# Patient Record
Sex: FEMALE
[De-identification: names, ages, dates, MRNs, and addresses within clinical notes are randomized per-mention and may not be internally consistent; named-entity substitution may affect disease eponyms.]

---

## 2020-11-16 ENCOUNTER — HOSPITAL ENCOUNTER (EMERGENCY)
Dept: HOSPITAL 33 - ED | Age: 35
Discharge: HOME | End: 2020-11-16
Payer: COMMERCIAL

## 2020-11-16 VITALS — SYSTOLIC BLOOD PRESSURE: 124 MMHG | DIASTOLIC BLOOD PRESSURE: 84 MMHG | HEART RATE: 62 BPM

## 2020-11-16 VITALS — OXYGEN SATURATION: 97 %

## 2020-11-16 DIAGNOSIS — R10.31: Primary | ICD-10-CM

## 2020-11-16 LAB
ALBUMIN SERPL-MCNC: 4.3 G/DL (ref 3.5–5)
ALP SERPL-CCNC: 61 U/L (ref 38–126)
ALT SERPL-CCNC: 14 U/L (ref 0–35)
AMYLASE SERPL-CCNC: 72 U/L (ref 30–110)
ANION GAP SERPL CALC-SCNC: 9.8 MEQ/L (ref 5–15)
AST SERPL QL: 22 U/L (ref 14–36)
BASOPHILS # BLD AUTO: 0.02 10*3/UL (ref 0–0.4)
BASOPHILS NFR BLD AUTO: 0.3 % (ref 0–0.4)
BILIRUB BLD-MCNC: 0.5 MG/DL (ref 0.2–1.3)
BUN SERPL-MCNC: 11 MG/DL (ref 7–17)
CALCIUM SPEC-MCNC: 9.8 MG/DL (ref 8.4–10.2)
CHLORIDE SERPL-SCNC: 103 MMOL/L (ref 98–107)
CO2 SERPL-SCNC: 28 MMOL/L (ref 22–30)
CREAT SERPL-MCNC: 0.79 MG/DL (ref 0.52–1.04)
EOSINOPHIL # BLD AUTO: 0.26 10*3/UL (ref 0–0.5)
GFR SERPLBLD BASED ON 1.73 SQ M-ARVRAT: > 60 ML/MIN
GLUCOSE SERPL-MCNC: 103 MG/DL (ref 74–106)
GLUCOSE UR-MCNC: NEGATIVE MG/DL
HCT VFR BLD AUTO: 42.5 % (ref 35–47)
HGB BLD-MCNC: 14.5 GM/DL (ref 12–16)
LIPASE SERPL-CCNC: 155 U/L (ref 23–300)
LYMPHOCYTES # SPEC AUTO: 2.77 10*3/UL (ref 1–4.6)
MCH RBC QN AUTO: 29.8 PG (ref 26–32)
MCHC RBC AUTO-ENTMCNC: 34.1 G/DL (ref 32–36)
MONOCYTES # BLD AUTO: 0.69 10*3/UL (ref 0–1.3)
PLATELET # BLD AUTO: 307 K/MM3 (ref 150–450)
POTASSIUM SERPLBLD-SCNC: 4 MMOL/L (ref 3.5–5.1)
PROT SERPL-MCNC: 7.7 G/DL (ref 6.3–8.2)
PROT UR STRIP-MCNC: NEGATIVE MG/DL
RBC # BLD AUTO: 4.87 M/MM3 (ref 4.1–5.4)
RBC #/AREA URNS HPF: (no result) /HPF (ref 0–2)
SODIUM SERPL-SCNC: 137 MMOL/L (ref 137–145)
WBC # BLD AUTO: 7.7 K/MM3 (ref 4–10.5)
WBC #/AREA URNS HPF: (no result) /HPF (ref 0–5)

## 2020-11-16 PROCEDURE — 36000 PLACE NEEDLE IN VEIN: CPT

## 2020-11-16 PROCEDURE — 81001 URINALYSIS AUTO W/SCOPE: CPT

## 2020-11-16 PROCEDURE — 76830 TRANSVAGINAL US NON-OB: CPT

## 2020-11-16 PROCEDURE — 99284 EMERGENCY DEPT VISIT MOD MDM: CPT

## 2020-11-16 PROCEDURE — 84703 CHORIONIC GONADOTROPIN ASSAY: CPT

## 2020-11-16 PROCEDURE — 96374 THER/PROPH/DIAG INJ IV PUSH: CPT

## 2020-11-16 PROCEDURE — 36415 COLL VENOUS BLD VENIPUNCTURE: CPT

## 2020-11-16 PROCEDURE — 85025 COMPLETE CBC W/AUTO DIFF WBC: CPT

## 2020-11-16 PROCEDURE — 83690 ASSAY OF LIPASE: CPT

## 2020-11-16 PROCEDURE — 74176 CT ABD & PELVIS W/O CONTRAST: CPT

## 2020-11-16 PROCEDURE — 82150 ASSAY OF AMYLASE: CPT

## 2020-11-16 PROCEDURE — 80053 COMPREHEN METABOLIC PANEL: CPT

## 2020-11-16 NOTE — ERPHSYRPT
- History of Present Illness


Historian: patient


Patient Subjective Stated Complaint: Right sided pain x 1 week, RLQ pain x 

today.


Triage Nursing Assessment: Pt reports right flank pain last week, today having 

worsening right sided pain and lower pelvic pain as well as suprapubic pain. 

Abdomen tender to palpation, bowel sounds audible x 4, denies frequency or 

hematuria. Right sided pain is constant. Reports white vaginal discharge. LMP 1 

week ago. Skin PWD.


Physician History: 





36 yo wf w supra-pubic pain today/9 out of 10/sharp/R flank pain/nothing makes 

pain better or worse. Pt currently on cipro for uti prescribed last week by Dr. Dubon. She denies fever/dysuria/hematuria/N/V/D. She has had a BTL.


Timing/Duration: today


Activities at Onset: rest


Quality: sharpness


Abdominal Pain Onset Location: suprapubic


Pain Radiation: flank


Severity of Pain-Max: severe


Severity of Pain-Current: severe


Modifying Factors: Improves With: nothing


Associated Symptoms: No chest pain, No diaphoresis, No diarrhea, No 

fever/chills, No fatigue, No headache, No heartburn, No loss of appetite, No 

nausea, No neck pain, No rash, No shortness of breath, No syncope, No testicular

pain, No vomiting, No weakness


Previous symptoms: no prior history


Allergies/Adverse Reactions: 








No Known Drug Allergies Allergy (Unverified 11/16/20 19:03)


   





Hx Tetanus, Diphtheria Vaccination/Date Given: Yes


Hx Influenza Vaccination/Date Given: Yes


Hx Pneumococcal Vaccination/Date Given: Yes


Immunizations Up to Date: Yes





Travel Risk





- International Travel


Have you traveled outside of the country in past 3 weeks: No





- Coronavirus Screening


Are you exhibiting any of the following symptoms?: No


Close contact with a COVID-19 positive Pt in past 14-21 Days: No





- Review of Systems


Constitutional: No Symptoms


Eyes: No Symptoms


Ears, Nose, & Throat: No Symptoms


Respiratory: No Symptoms


Cardiac: No Symptoms


Abdominal/Gastrointestinal: No Symptoms, Abdominal Pain


Genitourinary Symptoms: No Symptoms


Musculoskeletal: No Symptoms


Skin: No Symptoms


Neurological: No Symptoms


Psychological: No Symptoms


Endocrine: No Symptoms


Hematologic/Lymphatic: No Symptoms


Immunological/Allergic: No Symptoms





- Past Medical History


Pertinent Past Medical History: No





- Past Surgical History


Past Surgical History: Yes


Gastrointestinal: Cholecystectomy


Female Surgical History: Tubal Ligation





- Social History


Smoking Status: Never smoker


Exposure to second hand smoke: Yes


Drug Use: none


Patient Lives Alone: No


Significant Family History: no pertinent family hx





- Female History


Hx Last Menstrual Period: 11/09/2020


Hx Pregnant Now: No (hx tubal)





- Nursing Vital Signs


Nursing Vital Signs: 


                               Initial Vital Signs











Temperature  98.8 F   11/16/20 18:53


 


Pulse Rate  95 H  11/16/20 18:53


 


Respiratory Rate  16   11/16/20 18:53


 


Blood Pressure  140/91   11/16/20 18:53


 


O2 Sat by Pulse Oximetry  97   11/16/20 18:53








                                   Pain Scale











Pain Intensity                 3

















- Physical Exam


General Appearance: no apparent distress


Eye Exam: PERRL/EOMI, eyes nml inspection


Ears, Nose, Throat Exam: normal ENT inspection, TMs normal, pharynx normal, 

moist mucous membranes


Neck Exam: normal inspection, non-tender, supple, full range of motion, No 

meningismus, No mass, No Brudzinski, No Kernig's


Respiratory Exam: normal breath sounds, lungs clear, airway intact, No 

respiratory distress


Cardiovascular Exam: regular rate/rhythm, normal heart sounds, normal peripheral

pulses, No murmur


Gastrointestinal/Abdomen Exam: soft, tenderness (Supra-pubic ttp wo 

guarding/rebound)


Back Exam: normal inspection, normal range of motion, CVA tenderness (R)


Extremity Exam: normal inspection, normal range of motion


Neurologic Exam: alert, oriented x 3, cooperative, CNs II-XII nml as tested, 

normal mood/affect, sensation nml, No motor deficits, No sensory deficit


Skin Exam: normal color, warm, dry, No rash


Lymphatic Exam: No adenopathy


**SpO2 Interpretation**: normal


SpO2: 97


O2 Delivery: Room Air





- CT Exams


  ** Abdomen/Pelvis


CT Interpretation: Discussed w/radiologist (Splenomegaly/Nothing acute/did not 

visualize appendix but no inflammation)





- Radiology Ultrasound Exam


  ** Pelvis


Ultrasound: Other (Neg for torsion/cyst per tech)


Ordered Tests: 


                               Active Orders 24 hr











 Category Date Time Status


 


 ABDOMEN AND PELVIS W/0 CONTRAS [CT] Stat Exams  11/16/20 19:54 Taken


 


 PELVIS TRANS VAGINAL [US] Stat Exams  11/16/20 22:03 Taken


 


 AMYLASE Stat Lab  11/16/20 20:40 Completed


 


 CBC W DIFF Stat Lab  11/16/20 20:40 Completed


 


 CMP Stat Lab  11/16/20 20:40 Completed


 


 HCG,QUALITATIVE URINE Stat Lab  11/16/20 19:00 Completed


 


 LIPASE Stat Lab  11/16/20 20:40 Completed


 


 UA W/RFX UR CULTURE Stat Lab  11/16/20 19:05 Completed








Medication Summary














Discontinued Medications














Generic Name Dose Route Start Last Admin





  Trade Name Freq  PRN Reason Stop Dose Admin


 


Ketorolac Tromethamine  30 mg  11/16/20 20:30  11/16/20 20:37





  Toradol 30 Mg Injection***  IV  11/16/20 20:31  30 mg





  STAT ONE   Administration


 


Ketorolac Tromethamine  Confirm  11/16/20 20:36 





  Toradol 30 Mg Injection***  Administered  11/16/20 20:37 





  Dose  





  30 mg  





  .ROUTE  





  .STK-MED ONE  











Lab/Rad Data: 


                           Laboratory Result Diagrams





                                 11/16/20 20:40 





                                 11/16/20 20:40 





                               Laboratory Results











  11/16/20 11/16/20 11/16/20 Range/Units





  20:40 20:40 19:05 


 


WBC   7.7   (4.0-10.5)  K/mm3


 


RBC   4.87   (4.1-5.4)  M/mm3


 


Hgb   14.5   (12.0-16.0)  gm/dl


 


Hct   42.5   (35-47)  %


 


MCV   87.3   ()  fl


 


MCH   29.8   (26-32)  pg


 


MCHC   34.1   (32-36)  g/dl


 


RDW   12.7   (11.5-14.0)  %


 


Plt Count   307   (150-450)  K/mm3


 


MPV   9.5   (7.5-11.0)  fl


 


Gran %   51.1   (36.0-66.0)  %


 


Eos # (Auto)   0.26   (0-0.5)  


 


Absolute Lymphs (auto)   2.77   (1.0-4.6)  


 


Absolute Monos (auto)   0.69   (0.0-1.3)  


 


Lymphocytes %   36.2   (24.0-44.0)  %


 


Monocytes %   9.0   (0.0-12.0)  %


 


Eosinophils %   3.4   (0.00-5.0)  %


 


Basophils %   0.3   (0.0-0.4)  %


 


Absolute Granulocytes   3.92   (1.4-6.9)  


 


Basophils #   0.02   (0-0.4)  


 


Sodium  137    (137-145)  mmol/L


 


Potassium  4.0    (3.5-5.1)  mmol/L


 


Chloride  103    ()  mmol/L


 


Carbon Dioxide  28    (22-30)  mmol/L


 


Anion Gap  9.8    (5-15)  MEQ/L


 


BUN  11    (7-17)  mg/dL


 


Creatinine  0.79    (0.52-1.04)  mg/dL


 


Estimated GFR  > 60.0    ML/MIN


 


Glucose  103    ()  mg/dL


 


Calcium  9.8    (8.4-10.2)  mg/dL


 


Total Bilirubin  0.50    (0.2-1.3)  mg/dL


 


AST  22    (14-36)  U/L


 


ALT  14    (0-35)  U/L


 


Alkaline Phosphatase  61    ()  U/L


 


Serum Total Protein  7.7    (6.3-8.2)  g/dL


 


Albumin  4.3    (3.5-5.0)  g/dL


 


Amylase  72    ()  U/L


 


Lipase  155    ()  U/L


 


Urine Color    YELLOW  (YELLOW)  


 


Urine Appearance    SLIGHTLY CLOUDY  (CLEAR)  


 


Urine pH    6.0  (5-6)  


 


Ur Specific Gravity    1.019  (1.005-1.025)  


 


Urine Protein    NEGATIVE  (Negative)  


 


Urine Ketones    NEGATIVE  (NEGATIVE)  


 


Urine Blood    NEGATIVE  (0-5)  Dereck/ul


 


Urine Nitrite    NEGATIVE  (NEGATIVE)  


 


Urine Bilirubin    NEGATIVE  (NEGATIVE)  


 


Urine Urobilinogen    NEGATIVE  (0-1)  mg/dL


 


Ur Leukocyte Esterase    NEGATIVE  (NEGATIVE)  


 


Urine WBC (Auto)    0-2  (0-5)  /HPF


 


Urine RBC (Auto)    0-2  (0-2)  /HPF


 


U Epithel Cells (Auto)    RARE  (FEW)  /HPF


 


Urine Bacteria (Auto)    NONE  (NEGATIVE)  /HPF


 


Urine Mucus (Auto)    SLIGHT  (NEGATIVE)  /HPF


 


Urine Culture Reflexed    NO  (NO)  


 


Urine Glucose    NEGATIVE  (NEGATIVE)  mg/dL


 


Urine HCG, Qual     (Negative)  














  11/16/20 Range/Units





  19:00 


 


WBC   (4.0-10.5)  K/mm3


 


RBC   (4.1-5.4)  M/mm3


 


Hgb   (12.0-16.0)  gm/dl


 


Hct   (35-47)  %


 


MCV   ()  fl


 


MCH   (26-32)  pg


 


MCHC   (32-36)  g/dl


 


RDW   (11.5-14.0)  %


 


Plt Count   (150-450)  K/mm3


 


MPV   (7.5-11.0)  fl


 


Gran %   (36.0-66.0)  %


 


Eos # (Auto)   (0-0.5)  


 


Absolute Lymphs (auto)   (1.0-4.6)  


 


Absolute Monos (auto)   (0.0-1.3)  


 


Lymphocytes %   (24.0-44.0)  %


 


Monocytes %   (0.0-12.0)  %


 


Eosinophils %   (0.00-5.0)  %


 


Basophils %   (0.0-0.4)  %


 


Absolute Granulocytes   (1.4-6.9)  


 


Basophils #   (0-0.4)  


 


Sodium   (137-145)  mmol/L


 


Potassium   (3.5-5.1)  mmol/L


 


Chloride   ()  mmol/L


 


Carbon Dioxide   (22-30)  mmol/L


 


Anion Gap   (5-15)  MEQ/L


 


BUN   (7-17)  mg/dL


 


Creatinine   (0.52-1.04)  mg/dL


 


Estimated GFR   ML/MIN


 


Glucose   ()  mg/dL


 


Calcium   (8.4-10.2)  mg/dL


 


Total Bilirubin   (0.2-1.3)  mg/dL


 


AST   (14-36)  U/L


 


ALT   (0-35)  U/L


 


Alkaline Phosphatase   ()  U/L


 


Serum Total Protein   (6.3-8.2)  g/dL


 


Albumin   (3.5-5.0)  g/dL


 


Amylase   ()  U/L


 


Lipase   ()  U/L


 


Urine Color   (YELLOW)  


 


Urine Appearance   (CLEAR)  


 


Urine pH   (5-6)  


 


Ur Specific Gravity   (1.005-1.025)  


 


Urine Protein   (Negative)  


 


Urine Ketones   (NEGATIVE)  


 


Urine Blood   (0-5)  Dereck/ul


 


Urine Nitrite   (NEGATIVE)  


 


Urine Bilirubin   (NEGATIVE)  


 


Urine Urobilinogen   (0-1)  mg/dL


 


Ur Leukocyte Esterase   (NEGATIVE)  


 


Urine WBC (Auto)   (0-5)  /HPF


 


Urine RBC (Auto)   (0-2)  /HPF


 


U Epithel Cells (Auto)   (FEW)  /HPF


 


Urine Bacteria (Auto)   (NEGATIVE)  /HPF


 


Urine Mucus (Auto)   (NEGATIVE)  /HPF


 


Urine Culture Reflexed   (NO)  


 


Urine Glucose   (NEGATIVE)  mg/dL


 


Urine HCG, Qual  NEGATIVE  (Negative)  














- Progress


Progress: improved


Progress Note: 





11/16/20 19:17


Pty refused pain meds.


11/16/20 22:10


Pain improved w 30mg IV toradol


Counseled pt/family regarding: lab results, diagnosis, need for follow-up, rad 

results





- Departure


Departure Disposition: Home


Clinical Impression: 


 Abdominal pain





Condition: Stable


Critical Care Time: No


Referrals: 


WARREN DUBON MD [Primary Care Provider] - 


Instructions:  Acute Abdomen (Belly Pain), Adult (DC)


Additional Instructions: 


Follow up with family MD or gynecologist


Return to ER for increasing pain or temperature greater than 100.5

## 2020-11-17 NOTE — XRAY
Indication: Lower abdominal pain.  History pancreatitis.



Multiple contiguous axial images obtained through the abdomen and pelvis

without contrast as ordered.



Comparison: None



Lung bases demonstrates minimal fibrosis/scarring.  No infiltrate or effusion.

 Heart is not enlarged.



Noncontrasted stomach and bowel loops appear nonobstructed.  Appendix not

seen.  No free fluid/air.  Previous cholecystectomy.  Spleen is enlarged

measuring 13.3 cm in greatest axial dimension.  Remaining liver, pancreas,

spleen, adrenal glands, kidneys, ureters, bladder, uterus, and aorta appear

unremarkable for noncontrast exam.



Osseous structures intact.  No ventral or inguinal hernias.



Impression: Splenomegaly.  Remaining CT abdomen/pelvis without contrast exam

is negative.

## 2020-11-17 NOTE — XRAY
Indication: Right lower quadrant pain.



Two-dimensional and transvaginal pelvic sonogram performed.



Comparison: None



Uterus anteverted measuring 8.9 x 4.5 x 4.9 cm.  Myometrium appears

homogeneous.  Endometrial stripe measures 11.5 mm.  No endometrial cavity mass

or fluid collection.  Right ovary measures 2.4 x 1.9 x 2.7 cm and the left

measures 2.2 x 1.6 x 2.5 cm.  Normal follicular cysts and perfusion

bilaterally.  No suspicious adnexal mass.  Tiny cul-de-sac fluid presumed

physiologic from rupture/leaking cyst.



Impression: Tiny physiologic cul-de-sac fluid.  Remaining transvaginal pelvic

sonogram is negative.

## 2023-04-18 ENCOUNTER — HOSPITAL ENCOUNTER (EMERGENCY)
Dept: HOSPITAL 33 - ED | Age: 38
Discharge: HOME | End: 2023-04-18
Payer: COMMERCIAL

## 2023-04-18 VITALS — DIASTOLIC BLOOD PRESSURE: 70 MMHG | SYSTOLIC BLOOD PRESSURE: 116 MMHG | OXYGEN SATURATION: 98 % | HEART RATE: 90 BPM

## 2023-04-18 DIAGNOSIS — R10.12: Primary | ICD-10-CM

## 2023-04-18 DIAGNOSIS — R11.0: ICD-10-CM

## 2023-04-18 LAB
ALBUMIN SERPL-MCNC: 4.4 G/DL (ref 3.5–5)
ALP SERPL-CCNC: 60 U/L (ref 38–126)
ALT SERPL-CCNC: 26 U/L (ref 0–35)
AMPHETAMINES UR QL: NEGATIVE
AMYLASE SERPL-CCNC: 80 U/L (ref 30–110)
ANION GAP SERPL CALC-SCNC: 10.6 MEQ/L (ref 5–15)
AST SERPL QL: 52 U/L (ref 14–36)
B-HCG SERPL QL: NEGATIVE
BACTERIA UR CULT: NO
BARBITURATES UR QL: NEGATIVE
BASOPHILS # BLD AUTO: 0.03 X10^3/UL (ref 0–0.4)
BASOPHILS NFR BLD AUTO: 0.3 % (ref 0–0.4)
BENZODIAZ UR QL SCN: NEGATIVE
BILIRUB BLD-MCNC: 0.5 MG/DL (ref 0.2–1.3)
BUN SERPL-MCNC: 16 MG/DL (ref 7–17)
CALCIUM SPEC-MCNC: 9.6 MG/DL (ref 8.4–10.2)
CHLORIDE SERPL-SCNC: 101 MMOL/L (ref 98–107)
CO2 SERPL-SCNC: 32 MMOL/L (ref 22–30)
COCAINE UR QL SCN: NEGATIVE
CREAT SERPL-MCNC: 0.84 MG/DL (ref 0.52–1.04)
EOSINOPHIL # BLD AUTO: 0.16 X10^3/UL (ref 0–0.5)
GFR SERPLBLD BASED ON 1.73 SQ M-ARVRAT: > 60 ML/MIN
GLUCOSE SERPL-MCNC: 119 MG/DL (ref 74–106)
HCT VFR BLD AUTO: 44.2 % (ref 35–47)
HGB BLD-MCNC: 14.7 G/DL (ref 12–16)
IMM GRANULOCYTES # BLD: 0.02 X10^3U/L (ref 0–0.03)
IMM GRANULOCYTES NFR BLD: 0.2 % (ref 0–0.4)
LIPASE SERPL-CCNC: 216 U/L (ref 23–300)
LYMPHOCYTES # SPEC AUTO: 1.97 X10^3/UL (ref 1–4.6)
MCH RBC QN AUTO: 28.9 PG (ref 26–32)
MCHC RBC AUTO-ENTMCNC: 33.3 G/DL (ref 32–36)
METHADONE UR QL: NEGATIVE
MONOCYTES # BLD AUTO: 0.39 X10^3/UL (ref 0–1.3)
NRBC # BLD AUTO: 0 X10^3U/L (ref 0–0.01)
NRBC BLD AUTO-RTO: 0 % (ref 0–0.1)
OPIATES UR QL: NEGATIVE
PCP UR QL CFM>20 NG/ML: NEGATIVE
PLATELET # BLD AUTO: 271 X10^3/UL (ref 150–450)
POTASSIUM SERPLBLD-SCNC: 3.8 MMOL/L (ref 3.5–5.1)
PROT SERPL-MCNC: 7.9 G/DL (ref 6.3–8.2)
RBC # BLD AUTO: 5.08 X10^6/UL (ref 4.1–5.4)
RBC # URNS HPF: (no result) /HPF (ref 0–5)
SODIUM SERPL-SCNC: 139 MMOL/L (ref 137–145)
THC UR QL SCN: NEGATIVE
WBC # BLD AUTO: 10 X10^3/UL (ref 4–10.5)
WBC URNS QL MICRO: (no result) /HPF (ref 0–5)

## 2023-04-18 PROCEDURE — 96374 THER/PROPH/DIAG INJ IV PUSH: CPT

## 2023-04-18 PROCEDURE — 82150 ASSAY OF AMYLASE: CPT

## 2023-04-18 PROCEDURE — 83690 ASSAY OF LIPASE: CPT

## 2023-04-18 PROCEDURE — 80053 COMPREHEN METABOLIC PANEL: CPT

## 2023-04-18 PROCEDURE — 84703 CHORIONIC GONADOTROPIN ASSAY: CPT

## 2023-04-18 PROCEDURE — 36000 PLACE NEEDLE IN VEIN: CPT

## 2023-04-18 PROCEDURE — 96360 HYDRATION IV INFUSION INIT: CPT

## 2023-04-18 PROCEDURE — 36415 COLL VENOUS BLD VENIPUNCTURE: CPT

## 2023-04-18 PROCEDURE — 99284 EMERGENCY DEPT VISIT MOD MDM: CPT

## 2023-04-18 PROCEDURE — 85025 COMPLETE CBC W/AUTO DIFF WBC: CPT

## 2023-04-18 PROCEDURE — 80307 DRUG TEST PRSMV CHEM ANLYZR: CPT

## 2023-04-18 PROCEDURE — 81001 URINALYSIS AUTO W/SCOPE: CPT

## 2023-04-18 PROCEDURE — 83605 ASSAY OF LACTIC ACID: CPT

## 2023-04-18 PROCEDURE — 96375 TX/PRO/DX INJ NEW DRUG ADDON: CPT

## 2023-04-18 PROCEDURE — 74176 CT ABD & PELVIS W/O CONTRAST: CPT

## 2023-04-18 NOTE — ERPHSYRPT
- History of Present Illness


Time Seen by Provider: 04/18/23 02:00


Historian: patient, family


Exam Limitations: no limitations


Physician History: 





This is a 37-year-old overweight white female who presents to the emergency 

department with sudden onset of left upper quadrant left mid abdomen sharp, 

throbbing aching pain.  At approximately 130 this morning she took 2 Tums 

without relief.  She is nauseated but did not vomit.  She has not had a fever 

she denies chest pain, she has not had a cough.  She has had no diarrhea.  

Patient has a history in the past of pancreatitis and she states this feels 

similar.  Patient states that she has had a cholecystectomy and a bilateral 

tubal ligation in the past.  She has no known drug allergies and she takes no 

medications chronically.


Timing/Duration: today


Quality: aching, stabbing, throbbing


Abdominal Pain Onset Location: LUQ, other (Left of periumbilical region)


Pain Radiation: no radiation


Severity of Pain-Max: moderate


Modifying Factors: Improves With: antacids (Without relief)


Associated Symptoms: loss of appetite, nausea, No chest pain, No shortness of 

breath


Previous symptoms: same symptoms as today (Distant past)


Allergies/Adverse Reactions: 








No Known Drug Allergies Allergy (Unverified 11/16/20 19:03)


   





Hx Tetanus, Diphtheria Vaccination/Date Given: Yes


Hx Influenza Vaccination/Date Given: Yes


Hx Pneumococcal Vaccination/Date Given: Yes





Travel Risk





- International Travel


Have you traveled outside of the country in past 3 weeks: No





- Coronavirus Screening


Are you exhibiting any of the following symptoms?: No


Close contact with a COVID-19 positive Pt in past 14-21 Days: No





- Review of Systems


Constitutional: No Symptoms


Eyes: No Symptoms


Ears, Nose, & Throat: No Symptoms


Respiratory: No Symptoms


Cardiac: No Symptoms


Abdominal/Gastrointestinal: Abdominal Pain, Nausea


Genitourinary Symptoms: No Symptoms


Musculoskeletal: No Symptoms


Skin: No Symptoms


Neurological: No Symptoms


Psychological: No Symptoms


Endocrine: No Symptoms


Hematologic/Lymphatic: No Symptoms


Immunological/Allergic: No Symptoms


All Other Systems: Reviewed and Negative





- Past Medical History


Pertinent Past Medical History: No





- Past Surgical History


Past Surgical History: Yes


Gastrointestinal: Cholecystectomy


Female Surgical History: Tubal Ligation





- Social History


Smoking Status: Never smoker


Exposure to second hand smoke: Yes


Drug Use: none


Patient Lives Alone: No


Significant Family History: no pertinent family hx





- Nursing Vital Signs


Nursing Vital Signs: 


                               Initial Vital Signs











Temperature  97.5 F   04/18/23 01:56


 


Pulse Rate  114 H  04/18/23 01:56


 


Respiratory Rate  28 H  04/18/23 01:56


 


Blood Pressure  122/86   04/18/23 01:56


 


O2 Sat by Pulse Oximetry  100   04/18/23 01:56








                                   Pain Scale











Pain Intensity                 0

















- Physical Exam


General Appearance: mild distress, alert, anxiety


Eye Exam: PERRL/EOMI, eyes nml inspection


Ears, Nose, Throat Exam: normal ENT inspection, moist mucous membranes


Neck Exam: normal inspection, non-tender, supple, full range of motion


Respiratory Exam: normal breath sounds, lungs clear, airway intact, No chest 

tenderness, No respiratory distress


Cardiovascular Exam: tachycardia


Gastrointestinal/Abdomen Exam: soft, normal bowel sounds, tenderness (Left upper

quadrant left of periumbilical region), guarding, rebound


Pelvic Exam: not done


Rectal Exam: not done


Back Exam: normal inspection, normal range of motion, No CVA tenderness, No 

vertebral tenderness


Extremity Exam: normal inspection, normal range of motion, pelvis stable


Neurologic Exam: alert, oriented x 3, cooperative, CNs II-XII nml as tested, 

normal mood/affect, nml cerebellar function, nml station & gait, sensation nml


Skin Exam: normal color, warm, dry


Lymphatic Exam: No adenopathy


**SpO2 Interpretation**: normal


O2 Delivery: Room Air





- Course


Nursing assessment & vital signs reviewed: Yes


Ordered Tests: 


                               Active Orders 24 hr











 Category Date Time Status


 


 Clean Catch Urine Specimen STAT Care  04/18/23 02:32 Active


 


 IV Insertion STAT Care  04/18/23 02:00 Active


 


 ABDOMEN AND PELVIS W/0 CONTRAS [CT] Stat Exams  04/18/23 02:01 Completed


 


 AMYLASE Stat Lab  04/18/23 02:00 Completed


 


 CBC W DIFF Stat Lab  04/18/23 02:00 Completed


 


 CMP Stat Lab  04/18/23 02:00 Completed


 


 HCG QUALITATIVE, SERUM Stat Lab  04/18/23 02:00 Completed


 


 LIPASE Stat Lab  04/18/23 02:00 Completed


 


 Lactic Acid Stat Lab  04/18/23 02:18 Completed


 


 UA W/RFX UR CULTURE Stat Lab  04/18/23 02:27 Completed


 


 Urine Triage Profile Stat Lab  04/18/23 02:32 Completed








Medication Summary














Discontinued Medications














Generic Name Dose Route Start Last Admin





  Trade Name Freq  PRN Reason Stop Dose Admin


 


Hydromorphone HCl  1 mg  04/18/23 02:00  04/18/23 02:07





  Hydromorphone 1 Mg/1ml Inj*** 1 Mg/Ml Syringe  IV  04/18/23 02:01  1 mg





  STAT ONE   Administration


 


Hydromorphone HCl  Confirm  04/18/23 02:04 





  Hydromorphone 1 Mg/1ml Inj*** 1 Mg/Ml Syringe  Administered  04/18/23 02:05 





  Dose  





  1 mg  





  .ROUTE  





  .STK-MED ONE  


 


Sodium Chloride  1,000 mls @ 999 mls/hr  04/18/23 02:00  04/18/23 04:25





  Sodium Chloride 0.9% 1000 Ml  IV  04/18/23 03:00  Infused





  .Q1H1M STA   Infusion


 


Sodium Chloride  Confirm  04/18/23 02:03 





  Sodium Chloride 0.9% 1000 Ml  Administered  04/18/23 02:04 





  Dose  





  1,000 mls @ ud  





  .ROUTE  





  .STK-MED ONE  


 


Sodium Chloride  500 mls @ 999 mls/hr  04/18/23 04:11  04/18/23 04:17





  Sodium Chloride 0.9% 500 Ml  IV  04/18/23 04:41  999 mls/hr





  .Q31M ONE   Administration


 


Sodium Chloride  Confirm  04/18/23 04:16 





  Sodium Chloride 0.9% 500 Ml  Administered  04/18/23 04:17 





  Dose  





  500 mls @ ud  





  IV  





  .STK-MED ONE  


 


Lorazepam  1 mg  04/18/23 02:38  04/18/23 02:41





  Lorazepam 2 Mg/1 Ml*** 2 Mg Vial  IV  04/18/23 02:39  1 mg





  STAT ONE   Administration


 


Lorazepam  Confirm  04/18/23 02:40 





  Lorazepam 2 Mg/1 Ml*** 2 Mg Vial  Administered  04/18/23 02:41 





  Dose  





  2 mg  





  .ROUTE  





  .STK-MED ONE  


 


Pantoprazole Sodium  40 mg  04/18/23 02:00  04/18/23 02:07





  Pantoprazole 40 Mg Vial  IV  04/18/23 02:01  40 mg





  STAT ONE   Administration


 


Pantoprazole Sodium  Confirm  04/18/23 02:04 





  Pantoprazole 40 Mg Vial  Administered  04/18/23 02:05 





  Dose  





  40 mg  





  IV  





  .STK-MED ONE  











Lab/Rad Data: 


                           Laboratory Result Diagrams





                                 04/18/23 02:00 





                                 04/18/23 02:00 





                               Laboratory Results











  04/18/23 04/18/23 04/18/23 Range/Units





  02:32 02:27 02:18 


 


WBC     (4.0-10.5)  x10^3/uL


 


RBC     (4.1-5.4)  x10^6/uL


 


Hgb     (12.0-16.0)  g/dL


 


Hct     (35-47)  %


 


MCV     ()  fL


 


MCH     (26-32)  pg


 


MCHC     (32-36)  g/dL


 


RDW     (11.5-14.0)  %


 


Plt Count     (150-450)  x10^3/uL


 


MPV     (7.5-11.0)  fL


 


Gran %     (36.0-66.0)  %


 


Immature Gran % (Auto)     (0.00-0.4)  %


 


Nucleat RBC Rel Count     (0.00-0.1)  %


 


Eos # (Auto)     (0-0.5)  x10^3/uL


 


Immature Gran # (Auto)     (0.00-0.03)  x10^3u/L


 


Absolute Lymphs (auto)     (1.0-4.6)  x10^3/uL


 


Absolute Monos (auto)     (0.0-1.3)  x10^3/uL


 


Absolute Nucleated RBC     (0.00-0.01)  x10^3u/L


 


Lymphocytes %     (24.0-44.0)  %


 


Monocytes %     (0.0-12.0)  %


 


Eosinophils %     (0.00-5.0)  %


 


Basophils %     (0.0-0.4)  %


 


Absolute Granulocytes     (1.4-6.9)  x10^3/uL


 


Basophils #     (0-0.4)  x10^3/uL


 


Sodium     (137-145)  mmol/L


 


Potassium     (3.5-5.1)  mmol/L


 


Chloride     ()  mmol/L


 


Carbon Dioxide     (22-30)  mmol/L


 


Anion Gap     (5-15)  MEQ/L


 


BUN     (7-17)  mg/dL


 


Creatinine     (0.52-1.04)  mg/dL


 


Estimated GFR     ML/MIN


 


Glucose     ()  mg/dL


 


Lactic Acid    1.4  (0.4-2.0)  


 


Calcium     (8.4-10.2)  mg/dL


 


Total Bilirubin     (0.2-1.3)  mg/dL


 


AST     (14-36)  U/L


 


ALT     (0-35)  U/L


 


Alkaline Phosphatase     ()  U/L


 


Serum Total Protein     (6.3-8.2)  g/dL


 


Albumin     (3.5-5.0)  g/dL


 


Amylase     ()  U/L


 


Lipase     ()  U/L


 


Serum HCG, Qual     (NEGATIVE)  


 


Urine Color   Yellow   (Yellow)  


 


Urine Appearance   Clear   (Clear)  


 


Urine pH   6.0   (4.6-8.0)  


 


Ur Specific Gravity   1.025   (1.005-1.030)  


 


Urine Protein   Negative   (Negative)  


 


Urine Glucose (UA)   Negative   (Negative)  mg/dL


 


Urine Ketones   Negative   (Negative)  


 


Urine Blood   Negative   (Negative)  


 


Urine Nitrite   Negative   (Negative)  


 


Urine Bilirubin   Negative   (Negative)  


 


Urine Urobilinogen   1.0 A   (0.2)  mg/dL


 


Ur Leukocyte Esterase   Trace A   (Negative)  


 


U Hyaline Cast (Auto)   NONE SEEN   (0-2)  /LPF


 


Urine Microscopic RBC   0-2   (0-5)  /HPF


 


Urine Microscopic WBC   3-5   (0-5)  /HPF


 


Ur Epithelial Cells   None Seen   (None Seen)  /HPF


 


Urine Bacteria   None Seen   (None Seen)  /HPF


 


Urine Culture Reflexed   NO   (NO)  


 


Urine Opiates Level  NEGATIVE    (NEGATIVE)  


 


Ur Methadone  NEGATIVE    (NEGATIVE)  


 


Urine Barbiturates  NEGATIVE    (NEGATIVE)  


 


Ur Phencyclidine (PCP)  NEGATIVE    (NEGATIVE)  


 


Urine Amphetamine  NEGATIVE    (NEGATIVE)  


 


U Benzodiazepine Level  NEGATIVE    (NEGATIVE)  


 


Urine Cocaine  NEGATIVE    (NEGATIVE)  


 


Urine Marijuana (THC)  NEGATIVE    (NEGATIVE)  














  04/18/23 04/18/23 04/18/23 Range/Units





  02:00 02:00 02:00 


 


WBC    10.0  (4.0-10.5)  x10^3/uL


 


RBC    5.08  (4.1-5.4)  x10^6/uL


 


Hgb    14.7  (12.0-16.0)  g/dL


 


Hct    44.2  (35-47)  %


 


MCV    87.0  ()  fL


 


MCH    28.9  (26-32)  pg


 


MCHC    33.3  (32-36)  g/dL


 


RDW    12.5  (11.5-14.0)  %


 


Plt Count    271  (150-450)  x10^3/uL


 


MPV    9.3  (7.5-11.0)  fL


 


Gran %    74.3 H  (36.0-66.0)  %


 


Immature Gran % (Auto)    0.2  (0.00-0.4)  %


 


Nucleat RBC Rel Count    0.0  (0.00-0.1)  %


 


Eos # (Auto)    0.16  (0-0.5)  x10^3/uL


 


Immature Gran # (Auto)    0.02  (0.00-0.03)  x10^3u/L


 


Absolute Lymphs (auto)    1.97  (1.0-4.6)  x10^3/uL


 


Absolute Monos (auto)    0.39  (0.0-1.3)  x10^3/uL


 


Absolute Nucleated RBC    0.00  (0.00-0.01)  x10^3u/L


 


Lymphocytes %    19.7 L  (24.0-44.0)  %


 


Monocytes %    3.9  (0.0-12.0)  %


 


Eosinophils %    1.6  (0.00-5.0)  %


 


Basophils %    0.3  (0.0-0.4)  %


 


Absolute Granulocytes    7.44 H  (1.4-6.9)  x10^3/uL


 


Basophils #    0.03  (0-0.4)  x10^3/uL


 


Sodium   139   (137-145)  mmol/L


 


Potassium   3.8   (3.5-5.1)  mmol/L


 


Chloride   101   ()  mmol/L


 


Carbon Dioxide   32 H   (22-30)  mmol/L


 


Anion Gap   10.6   (5-15)  MEQ/L


 


BUN   16   (7-17)  mg/dL


 


Creatinine   0.84   (0.52-1.04)  mg/dL


 


Estimated GFR   > 60.0   ML/MIN


 


Glucose   119 H   ()  mg/dL


 


Lactic Acid     (0.4-2.0)  


 


Calcium   9.6   (8.4-10.2)  mg/dL


 


Total Bilirubin   0.50   (0.2-1.3)  mg/dL


 


AST   52 H   (14-36)  U/L


 


ALT   26   (0-35)  U/L


 


Alkaline Phosphatase   60   ()  U/L


 


Serum Total Protein   7.9   (6.3-8.2)  g/dL


 


Albumin   4.4   (3.5-5.0)  g/dL


 


Amylase   80   ()  U/L


 


Lipase   216   ()  U/L


 


Serum HCG, Qual  NEGATIVE    (NEGATIVE)  


 


Urine Color     (Yellow)  


 


Urine Appearance     (Clear)  


 


Urine pH     (4.6-8.0)  


 


Ur Specific Gravity     (1.005-1.030)  


 


Urine Protein     (Negative)  


 


Urine Glucose (UA)     (Negative)  mg/dL


 


Urine Ketones     (Negative)  


 


Urine Blood     (Negative)  


 


Urine Nitrite     (Negative)  


 


Urine Bilirubin     (Negative)  


 


Urine Urobilinogen     (0.2)  mg/dL


 


Ur Leukocyte Esterase     (Negative)  


 


U Hyaline Cast (Auto)     (0-2)  /LPF


 


Urine Microscopic RBC     (0-5)  /HPF


 


Urine Microscopic WBC     (0-5)  /HPF


 


Ur Epithelial Cells     (None Seen)  /HPF


 


Urine Bacteria     (None Seen)  /HPF


 


Urine Culture Reflexed     (NO)  


 


Urine Opiates Level     (NEGATIVE)  


 


Ur Methadone     (NEGATIVE)  


 


Urine Barbiturates     (NEGATIVE)  


 


Ur Phencyclidine (PCP)     (NEGATIVE)  


 


Urine Amphetamine     (NEGATIVE)  


 


U Benzodiazepine Level     (NEGATIVE)  


 


Urine Cocaine     (NEGATIVE)  


 


Urine Marijuana (THC)     (NEGATIVE)  














- Progress


Progress: improved, re-examined


Progress Note: 





04/18/23 06:00


This patient's medical issue is 1 of moderate complexity.  The level of 

complexity and the work-up performed based on the patient's past medical 

history, review of the patient's medication list, review of the drug allergy 

list, history present illness and physical findings on examination.  This work-

up included placement of intravenous line, infusion of normal saline solution, 

provide the patient with intravenous Zofran and intravenous Dilaudid.  Patient 

was very anxious and we added Ativan 1 mg intravenously to the regimen.  We 

ordered an amylase, lipase, CBC, CMP, urine drug screen, urinalysis, and CAT 

scan of the abdomen pelvis.  I reviewed the results of the above work-up.  The 

CAT scan was negative for any acute intra-abdominal or intrapelvic abnormality. 

 The CAT scan was read by the radiologist and I reviewed the impression.  

Patient has no evidence of any urinary tract infection.


Counseled pt/family regarding: lab results, diagnosis, need for follow-up, rad 

results





Medical Desision Making





- Discussion of managment


Reviewed:: Test results


Agreed on:: Treatment plan, need for follow-up





- Diagnostic Testing


Radiological Interpretation: Reviewed by me, Teleradiologist Report





- Risk of complications


The pt has a mod risk of morbidity or mortality based on: Need for prescription 

drug management





- Departure


Departure Disposition: Home


Clinical Impression: 


 Left upper quadrant abdominal pain





Condition: Stable


Critical Care Time: No


Referrals: 


WARREN DUBON MD [Primary Care Provider] - Follow up/PCP as directed


Additional Instructions: 


Drink plenty of fluids.  Avoid fatty greasy spicy foods.  Take your medication 

as prescribed.  Follow-up with your primary care physician for further 

evaluation and management.


Prescriptions: 


Famotidine 20 mg*** [Pepcid 20 MG***] 20 mg PO DAILY #10 tablet

## 2023-04-18 NOTE — XRAY
CLINICAL HISTORY:Abdominal pain.

COMPARISON:None.

TECHNIQUES:Axial sections of nonenhanced CT examination of the abdomen and

pelvis were obtained. Reformatted coronal and sagittal images were also

acquired.

FINDINGS:

The liver is normal in size and attenuation.  No diffuse or focal hepatic

abnormality is noted.

No definite evidence of intrahepatic biliary dilatation.

S/p cholecystectomy.  CBD is slightly prominent, likely secondary to

postcholecystectomy status.

Pancreas appears unremarkable on this unenhanced examination.  No definite

evidence of acute pancreatitis.

Bilateral adrenal glands and spleen are normal.

Both kidneys are normal in size and shape.  No definite evidence of

urolithiasis or obstructive uropathy.

The bladder is partially distended.  No definite intravesical abnormality was

seen.

Uterus and bilateral adnexa appear unremarkable.

Few phleboliths are noted within the pelvic region.

The stomach is normally distended.

No significant abnormality in the visualized large or small bowel loops.

The appendix is not separately visualized.  However, there are no secondary

signs of acute appendicitis.

No significant abdominal or pelvic lymphadenopathy.

No ascites or pneumoperitoneum.

Atelectatic changes in bilateral lung bases.

No significant bony abnormality.

IMPRESSION:

No significant abnormality was noted on this unenhanced examination.

No definite evidence of acute pancreatitis.

No significant interval changes from prior CT examination.



_________________________________________



Electronically Signed by: Marialuisa Nixon MD. (04/18/2023 02:34:54 CST)

## 2025-03-14 ENCOUNTER — HOSPITAL ENCOUNTER (EMERGENCY)
Dept: HOSPITAL 33 - ED | Age: 40
Discharge: HOME | End: 2025-03-14
Payer: COMMERCIAL

## 2025-03-14 VITALS — DIASTOLIC BLOOD PRESSURE: 71 MMHG | SYSTOLIC BLOOD PRESSURE: 111 MMHG

## 2025-03-14 VITALS — OXYGEN SATURATION: 98 %

## 2025-03-14 VITALS — HEART RATE: 99 BPM | RESPIRATION RATE: 20 BRPM | TEMPERATURE: 99.4 F

## 2025-03-14 DIAGNOSIS — R50.9: ICD-10-CM

## 2025-03-14 DIAGNOSIS — R05.1: ICD-10-CM

## 2025-03-14 DIAGNOSIS — R11.0: ICD-10-CM

## 2025-03-14 DIAGNOSIS — B97.4: ICD-10-CM

## 2025-03-14 DIAGNOSIS — R42: ICD-10-CM

## 2025-03-14 DIAGNOSIS — R07.9: ICD-10-CM

## 2025-03-14 DIAGNOSIS — B27.90: Primary | ICD-10-CM

## 2025-03-14 DIAGNOSIS — J06.9: ICD-10-CM

## 2025-03-14 DIAGNOSIS — R06.00: ICD-10-CM

## 2025-03-14 LAB
ALBUMIN SERPL-MCNC: 4 G/DL (ref 3.5–5)
ALP SERPL-CCNC: 62 U/L (ref 38–126)
ALT SERPL-CCNC: 26 U/L (ref 0–35)
AMYLASE SERPL-CCNC: 53 U/L (ref 30–110)
ANION GAP SERPL CALC-SCNC: 14.3 MEQ/L (ref 5–15)
AST SERPL QL: 34 U/L (ref 14–36)
B-HCG SERPL QL: NEGATIVE
BASE EXCESS BLDV CALC-SCNC: 3 MMOL/L (ref -2–2)
BASOPHILS # BLD AUTO: 0.03 X10^3/UL (ref 0.01–0.08)
BASOPHILS NFR BLD AUTO: 0.6 % (ref 0.1–1.2)
BILIRUB BLD-MCNC: 0.5 MG/DL (ref 0.2–1.3)
BUN SERPL-MCNC: 10 MG/DL (ref 7–17)
CALCIUM SPEC-MCNC: 9.4 MG/DL (ref 8.4–10.2)
CHLORIDE SERPL-SCNC: 104 MMOL/L (ref 98–107)
CO2 SERPL-SCNC: 26 MMOL/L (ref 22–30)
COHGB MFR BLDV: 3.7 % T HGB (ref 0–6.9)
CREAT SERPL-MCNC: 0.68 MG/DL (ref 0.52–1.04)
EOSINOPHIL # BLD AUTO: 0.19 X10^3/UL (ref 0.04–0.36)
FLUAV AG NPH QL IA: NEGATIVE
FLUBV AG NPH QL IA: NEGATIVE
GFR SERPLBLD BASED ON 1.73 SQ M-ARVRAT: 112.1 ML/MIN
GLUCOSE SERPL-MCNC: 98 MG/DL (ref 74–106)
HCO3 BLDV-SCNC: 28.5 MEQ/L (ref 22–28)
HCT VFR BLD AUTO: 39.4 % (ref 34.1–44.9)
HGB BLD-MCNC: 13.1 G/DL (ref 11.2–15.7)
HGB BLDV-MCNC: 13.7 G/DL
IMM GRANULOCYTES # BLD: 0.02 X10^3U/L (ref 0–0.03)
IMM GRANULOCYTES NFR BLD: 0.4 % (ref 0–0.43)
INHALED O2 CONCENTRATION: 21 %
LIPASE SERPL-CCNC: 155 U/L (ref 23–300)
LYMPHOCYTES # SPEC AUTO: 1.66 X10^3/UL (ref 1.18–3.74)
MAGNESIUM SERPL-MCNC: 1.9 MG/DL (ref 1.6–2.3)
MCH RBC QN AUTO: 28.1 PG (ref 25.6–32.2)
MCHC RBC AUTO-ENTMCNC: 33.2 G/DL (ref 32.2–35.5)
MONOCYTES # BLD AUTO: 0.59 X10^3/UL (ref 0.24–0.86)
NRBC # BLD AUTO: 0 X10^3U/L (ref 0–0.01)
NRBC BLD AUTO-RTO: 0 % (ref 0–0.2)
NT-PROBNP SERPL-MCNC: 30.6 PG/ML (ref ?–300)
PCO2 BLDV: 46 MM/HG (ref 42–55)
PLATELET # BLD AUTO: 266 X10^3/UL (ref 182–369)
PO2 BLDV: 37 MM/HG (ref 25–40)
POTASSIUM BLDV-SCNC: 4 MMOL/L (ref 3.5–5.1)
POTASSIUM SERPLBLD-SCNC: 4.2 MMOL/L (ref 3.5–5.1)
PROT SERPL-MCNC: 7.2 G/DL (ref 6.3–8.2)
RBC # BLD AUTO: 4.66 X10^6/UL (ref 3.93–5.22)
RSV AG SPEC QL IA: POSITIVE
SAO2 % BLDV: 63.9 % (ref 95–100)
SARS-COV-2 AG RESP QL IA.RAPID: NEGATIVE
SODIUM SERPL-SCNC: 140 MMOL/L (ref 135–145)
TROPONIN T SERPL HS-MCNC: < 0.012 NG/ML (ref 0–0.03)
WBC # BLD AUTO: 5.2 X10^3/UL (ref 3.98–10.04)

## 2025-03-14 PROCEDURE — 86308 HETEROPHILE ANTIBODY SCREEN: CPT

## 2025-03-14 PROCEDURE — 83690 ASSAY OF LIPASE: CPT

## 2025-03-14 PROCEDURE — 71046 X-RAY EXAM CHEST 2 VIEWS: CPT

## 2025-03-14 PROCEDURE — 84484 ASSAY OF TROPONIN QUANT: CPT

## 2025-03-14 PROCEDURE — 80053 COMPREHEN METABOLIC PANEL: CPT

## 2025-03-14 PROCEDURE — 83605 ASSAY OF LACTIC ACID: CPT

## 2025-03-14 PROCEDURE — 36415 COLL VENOUS BLD VENIPUNCTURE: CPT

## 2025-03-14 PROCEDURE — 84703 CHORIONIC GONADOTROPIN ASSAY: CPT

## 2025-03-14 PROCEDURE — 93005 ELECTROCARDIOGRAM TRACING: CPT

## 2025-03-14 PROCEDURE — 99285 EMERGENCY DEPT VISIT HI MDM: CPT

## 2025-03-14 PROCEDURE — 85379 FIBRIN DEGRADATION QUANT: CPT

## 2025-03-14 PROCEDURE — 87651 STREP A DNA AMP PROBE: CPT

## 2025-03-14 PROCEDURE — 83735 ASSAY OF MAGNESIUM: CPT

## 2025-03-14 PROCEDURE — 99283 EMERGENCY DEPT VISIT LOW MDM: CPT

## 2025-03-14 PROCEDURE — 85025 COMPLETE CBC W/AUTO DIFF WBC: CPT

## 2025-03-14 PROCEDURE — 0241U: CPT

## 2025-03-14 PROCEDURE — 82805 BLOOD GASES W/O2 SATURATION: CPT

## 2025-03-14 PROCEDURE — 82150 ASSAY OF AMYLASE: CPT

## 2025-03-14 PROCEDURE — 83880 ASSAY OF NATRIURETIC PEPTIDE: CPT

## 2025-03-14 NOTE — ERPHSYRPT
- History of Present Illness


Time Seen by Provider: 03/14/25 11:05


Source: patient


Exam Limitations: no limitations


Patient Subjective Stated Complaint: pt reports cough that worsens after eating 

and drinking, states approx one week ago she had an illness/flu-like s/s and 

recovered at home, she is concerned for pneumonia, pt states she was seen at 

[facility] ED last night and left without being seen by the MD, but did have a CXR and

resp swab but does not know results.


Triage Nursing Assessment: pt is aox3, pupils perrl, afebrile, pt working on her

laptop in room, resps easy and non labored, lung sounds are clear throughout, pt

with intermittent cough, radial pulses strong and equal, cap refill < 3 seconds,

pt skin pink warm dry.


Physician History: 





Since 13 days ago pt has had a cough productive of green phlegm, fever up to 103

degrees & chest pain; for the past 2 days dizziness, diaphoresis, nausea and 

shortness of air.


Allergies/Adverse Reactions: 








No Known Drug Allergies Allergy (Unverified 11/16/20 19:03)


   





Hx Tetanus, Diphtheria Vaccination/Date Given: Yes


Hx Influenza Vaccination/Date Given: No


Hx Pneumococcal Vaccination/Date Given: No


Immunizations Up to Date: Yes





Travel Risk





- International Travel


Have you traveled outside of the country in past 3 weeks: No





- Emerging Infectious Disease


Are you exhibiting symptoms associated with any current EIDs: Yes


Symptoms: Cough: New Onset





- Review of Systems


Constitutional: Fever


Respiratory: Cough, Dyspnea


Cardiac: Chest Pain


Abdominal/Gastrointestinal: Nausea, No Abdominal Pain, No Vomiting, No Diarrhea


Neurological: Dizziness, No Headache





- Past Medical History


Pertinent Past Medical History: Yes


Neurological History: No Pertinent History


ENT History: No Pertinent History


Cardiac History: No Pertinent History


Respiratory History: Asthma, COPD


Endocrine Medical History: No Pertinent History


Musculoskeletal History: No Pertinent History


GI Medical History: Pancreatitis, Other


 History: No Pertinent History


Psycho-Social History: No Pertinent History


Female Reproductive Disorders: No Pertinent History


Other Medical History: gall stones.  PT REPORTS COVID INDUCED COPD PER DR DUBON





- Past Surgical History


Past Surgical History: Yes


Neuro Surgical History: No Pertinent History


Cardiac: No Pertinent History


Respiratory: No Pertinent History


Gastrointestinal: Cholecystectomy


Genitourinary: No Pertinent History


Musculoskeletal: No Pertinent History


Female Surgical History: Dilation & Curettage, Tubal Ligation, Other


Significant Family History: no pertinent family hx





- Female History


Hx Last Menstrual Period: 03/04/25


Hx Pregnant Now: No





- Social History


Smoking Status: Never smoker


Exposure to second hand smoke: No


Drug Use: none





- Social Determinants of Health


Will the patient participate in the screening: Yes


Do you worry about a steady place to live?: No


Do you have any problems with any of the following?: No known problems


In the past 12 months,have you had to go without utilities?: No


Transportation Issues: No


Has anyone in your support network made you feel unsafe?: No


Have you or anyone in your house had to go w/o enough food: No





- Nursing Vital Signs


Nursing Vital Signs: 


                               Initial Vital Signs











Temperature  99.4 F   03/14/25 10:22


 


Pulse Rate  99 H  03/14/25 10:22


 


Respiratory Rate  20   03/14/25 10:22


 


Blood Pressure  131/98   03/14/25 10:22


 


O2 Sat by Pulse Oximetry  98   03/14/25 10:22








                                   Pain Scale











Pain Intensity                 6

















- Physical Exam


General Appearance: alert


Eye Exam: eyes nml inspection


Ears, Nose, Throat Exam: TMs normal, pharyngeal erythema (mild)


Neck Exam: normal inspection


Respiratory Exam: lungs clear, airway intact


Cardiovascular Exam: normal heart sounds


Gastrointestinal/Abdomen Exam: normal bowel sounds


Back Exam: normal inspection


Extremity Exam: No pedal edema


Neurologic Exam: alert, cooperative


Skin Exam: warm, dry


**SpO2 Interpretation**: normal


SpO2: 98


O2 Delivery: Room Air





- Course


Nursing assessment & vital signs reviewed: Yes


EKG Interpreted by Me: RATE (70), Sinus Rhythm, NORMAL AXIS, Other (QTc = 398)





- Radiology Exams


  ** Chest


X-ray Interpretation: Discussed w/ radiologist, No Pneumonia


Ordered Tests: 


                               Active Orders 24 hr











 Category Date Time Status


 


 EKG-ER Only STAT Care  03/14/25 11:28 Active


 


 CHEST 2 VIEWS (PA AND LAT) Stat Exams  03/14/25 11:29 Completed


 


 AMYLASE Stat Lab  03/14/25 11:52 Completed


 


 CBC W DIFF Stat Lab  03/14/25 11:52 Completed


 


 CMP Stat Lab  03/14/25 11:52 Completed


 


 D-DIMER QUANTITATIVE Stat Lab  03/14/25 11:52 Completed


 


 HCG QUALITATIVE, SERUM Stat Lab  03/14/25 11:52 Completed


 


 LIPASE Stat Lab  03/14/25 11:52 Completed


 


 Lactic Acid Stat Lab  03/14/25 12:02 Completed


 


 MAGNESIUM Stat Lab  03/14/25 11:52 Completed


 


 MONO SCREEN Stat Lab  03/14/25 11:52 Completed


 


 NT PRO BNPII Stat Lab  03/14/25 11:52 Completed


 


 TROPONIN Q4H Lab  03/14/25 11:52 Completed


 


 TROPONIN Q4H Lab  03/14/25 15:30 Ordered


 


 TROPONIN Q4H Lab  03/14/25 19:30 Ordered


 


 UA W/RFX UR CULTURE Stat Lab  03/14/25 11:29 Ordered


 


 VENOUS BLOOD GAS Stat Lab  03/14/25 12:02 Completed











Lab/Rad Data: 


                           Laboratory Result Diagrams





                                 03/14/25 11:52 





                                 03/14/25 11:52 





                               Laboratory Results











  03/14/25 03/14/25 03/14/25 Range/Units





  12:02 12:02 11:55 


 


WBC     (3.98-10.04)  x10^3/uL


 


RBC     (3.93-5.22)  x10^6/uL


 


Hgb     (11.2-15.7)  g/dL


 


Hct     (34.1-44.9)  %


 


MCV     (79.4-94.8)  fL


 


MCH     (25.6-32.2)  pg


 


MCHC     (32.2-35.5)  g/dL


 


RDW     (11.7-14.4)  %


 


Plt Count     (182-369)  x10^3/uL


 


MPV     (9.4-12.3)  fL


 


Gran %     (34.0-71.1)  %


 


Immature Gran % (Auto)     (0.001-0.429)  %


 


Nucleat RBC Rel Count     (0.00-0.2)  %


 


Eos # (Auto)     (0.04-0.36)  x10^3/uL


 


Immature Gran # (Auto)     (0.001-0.031)  x10^3u/L


 


Absolute Lymphs (auto)     (1.18-3.74)  x10^3/uL


 


Absolute Monos (auto)     (0.24-0.86)  x10^3/uL


 


Absolute Nucleated RBC     (0.00-0.012)  x10^3u/L


 


Lymphocytes %     (19.3-51.7)  %


 


Monocytes %     (4.7-12.5)  %


 


Eosinophils %     (0.7-5.8)  %


 


Basophils %     (0.1-1.2)  %


 


Absolute Granulocytes     (1.56-6.13)  x10^3/uL


 


Basophils #     (0.01-0.08)  x10^3/uL


 


D-Dimer     (0.0-0.50)  mg/L


 


pO2/FiO2 Ratio  21.0    %


 


VBG pH  7.40    (7.32-7.42)  


 


VBG pCO2 at Pat Temp  46    (42-55)  mm/Hg


 


VBG pO2 at Pat Temp  37    (25-40)  mm/Hg


 


VBG HCO3  28.5 H    (22-28)  meq/L


 


VBG O2 Sat (Ivanna)  63.9 L    ()  


 


VBG Base Excess  3.0 H    (-2.0-2.0)  


 


VBG Hemoglobin  13.7    


 


VBG Carboxyhemoglobin  3.7    (0.0-6.9)  % T HGB


 


POC Potassium  4.0    (3.5-5.1)  


 


Sodium     (135-145)  mmol/L


 


Potassium     (3.5-5.1)  mmol/L


 


Chloride     ()  mmol/L


 


Carbon Dioxide     (22-30)  mmol/L


 


Anion Gap     (5-15)  MEQ/L


 


BUN     (7-17)  mg/dL


 


Creatinine     (0.52-1.04)  mg/dL


 


Estimated GFR     ML/MIN


 


Glucose     ()  mg/dL


 


Lactic Acid   0.6   (0.4-2.0)  


 


Calcium     (8.4-10.2)  mg/dL


 


Magnesium     (1.6-2.3)  mg/dL


 


Total Bilirubin     (0.2-1.3)  mg/dL


 


AST     (14-36)  U/L


 


ALT     (0-35)  U/L


 


Alkaline Phosphatase     ()  U/L


 


Troponin I     (0.000-0.033)  ng/mL


 


NT-Pro-B Natriuret Pep     (<300)  pg/mL


 


Serum Total Protein     (6.3-8.2)  g/dL


 


Albumin     (3.5-5.0)  g/dL


 


Amylase     ()  U/L


 


Lipase     ()  U/L


 


Serum HCG, Qual     (NEGATIVE)  


 


Monoscreen     (NEGATIVE)  


 


Influenza Type A Ag    NEGATIVE  (NEGATIVE)  


 


Influenza Type B Ag    NEGATIVE  (NEGATIVE)  


 


RSV (PCR)    POSITIVE A  (NEGATIVE)  


 


SARS-CoV-2 (PCR)    NEGATIVE  (NEGATIVE)  


 


Group A Strep Antibody     (NEGATIVE)  














  03/14/25 03/14/25 03/14/25 Range/Units





  11:55 11:52 11:52 


 


WBC     (3.98-10.04)  x10^3/uL


 


RBC     (3.93-5.22)  x10^6/uL


 


Hgb     (11.2-15.7)  g/dL


 


Hct     (34.1-44.9)  %


 


MCV     (79.4-94.8)  fL


 


MCH     (25.6-32.2)  pg


 


MCHC     (32.2-35.5)  g/dL


 


RDW     (11.7-14.4)  %


 


Plt Count     (182-369)  x10^3/uL


 


MPV     (9.4-12.3)  fL


 


Gran %     (34.0-71.1)  %


 


Immature Gran % (Auto)     (0.001-0.429)  %


 


Nucleat RBC Rel Count     (0.00-0.2)  %


 


Eos # (Auto)     (0.04-0.36)  x10^3/uL


 


Immature Gran # (Auto)     (0.001-0.031)  x10^3u/L


 


Absolute Lymphs (auto)     (1.18-3.74)  x10^3/uL


 


Absolute Monos (auto)     (0.24-0.86)  x10^3/uL


 


Absolute Nucleated RBC     (0.00-0.012)  x10^3u/L


 


Lymphocytes %     (19.3-51.7)  %


 


Monocytes %     (4.7-12.5)  %


 


Eosinophils %     (0.7-5.8)  %


 


Basophils %     (0.1-1.2)  %


 


Absolute Granulocytes     (1.56-6.13)  x10^3/uL


 


Basophils #     (0.01-0.08)  x10^3/uL


 


D-Dimer    0.22  (0.0-0.50)  mg/L


 


pO2/FiO2 Ratio     %


 


VBG pH     (7.32-7.42)  


 


VBG pCO2 at Pat Temp     (42-55)  mm/Hg


 


VBG pO2 at Pat Temp     (25-40)  mm/Hg


 


VBG HCO3     (22-28)  meq/L


 


VBG O2 Sat (Ivanna)     ()  


 


VBG Base Excess     (-2.0-2.0)  


 


VBG Hemoglobin     


 


VBG Carboxyhemoglobin     (0.0-6.9)  % T HGB


 


POC Potassium     (3.5-5.1)  


 


Sodium     (135-145)  mmol/L


 


Potassium     (3.5-5.1)  mmol/L


 


Chloride     ()  mmol/L


 


Carbon Dioxide     (22-30)  mmol/L


 


Anion Gap     (5-15)  MEQ/L


 


BUN     (7-17)  mg/dL


 


Creatinine     (0.52-1.04)  mg/dL


 


Estimated GFR     ML/MIN


 


Glucose     ()  mg/dL


 


Lactic Acid     (0.4-2.0)  


 


Calcium     (8.4-10.2)  mg/dL


 


Magnesium     (1.6-2.3)  mg/dL


 


Total Bilirubin     (0.2-1.3)  mg/dL


 


AST     (14-36)  U/L


 


ALT     (0-35)  U/L


 


Alkaline Phosphatase     ()  U/L


 


Troponin I     (0.000-0.033)  ng/mL


 


NT-Pro-B Natriuret Pep     (<300)  pg/mL


 


Serum Total Protein     (6.3-8.2)  g/dL


 


Albumin     (3.5-5.0)  g/dL


 


Amylase     ()  U/L


 


Lipase     ()  U/L


 


Serum HCG, Qual   NEGATIVE   (NEGATIVE)  


 


Monoscreen   WEAKLY POSITIVE A   (NEGATIVE)  


 


Influenza Type A Ag     (NEGATIVE)  


 


Influenza Type B Ag     (NEGATIVE)  


 


RSV (PCR)     (NEGATIVE)  


 


SARS-CoV-2 (PCR)     (NEGATIVE)  


 


Group A Strep Antibody  NOT DETECTED    (NEGATIVE)  














  03/14/25 03/14/25 03/14/25 Range/Units





  11:52 11:52 11:52 


 


WBC    5.2  (3.98-10.04)  x10^3/uL


 


RBC    4.66  (3.93-5.22)  x10^6/uL


 


Hgb    13.1  (11.2-15.7)  g/dL


 


Hct    39.4  (34.1-44.9)  %


 


MCV    84.5  (79.4-94.8)  fL


 


MCH    28.1  (25.6-32.2)  pg


 


MCHC    33.2  (32.2-35.5)  g/dL


 


RDW    12.8  (11.7-14.4)  %


 


Plt Count    266  (182-369)  x10^3/uL


 


MPV    9.0 L  (9.4-12.3)  fL


 


Gran %    51.9  (34.0-71.1)  %


 


Immature Gran % (Auto)    0.4  (0.001-0.429)  %


 


Nucleat RBC Rel Count    0.0  (0.00-0.2)  %


 


Eos # (Auto)    0.19  (0.04-0.36)  x10^3/uL


 


Immature Gran # (Auto)    0.02  (0.001-0.031)  x10^3u/L


 


Absolute Lymphs (auto)    1.66  (1.18-3.74)  x10^3/uL


 


Absolute Monos (auto)    0.59  (0.24-0.86)  x10^3/uL


 


Absolute Nucleated RBC    0.00  (0.00-0.012)  x10^3u/L


 


Lymphocytes %    32.0  (19.3-51.7)  %


 


Monocytes %    11.4  (4.7-12.5)  %


 


Eosinophils %    3.7  (0.7-5.8)  %


 


Basophils %    0.6  (0.1-1.2)  %


 


Absolute Granulocytes    2.70  (1.56-6.13)  x10^3/uL


 


Basophils #    0.03  (0.01-0.08)  x10^3/uL


 


D-Dimer     (0.0-0.50)  mg/L


 


pO2/FiO2 Ratio     %


 


VBG pH     (7.32-7.42)  


 


VBG pCO2 at Pat Temp     (42-55)  mm/Hg


 


VBG pO2 at Pat Temp     (25-40)  mm/Hg


 


VBG HCO3     (22-28)  meq/L


 


VBG O2 Sat (Ivanna)     ()  


 


VBG Base Excess     (-2.0-2.0)  


 


VBG Hemoglobin     


 


VBG Carboxyhemoglobin     (0.0-6.9)  % T HGB


 


POC Potassium     (3.5-5.1)  


 


Sodium   140   (135-145)  mmol/L


 


Potassium   4.2   (3.5-5.1)  mmol/L


 


Chloride   104   ()  mmol/L


 


Carbon Dioxide   26   (22-30)  mmol/L


 


Anion Gap   14.3   (5-15)  MEQ/L


 


BUN   10   (7-17)  mg/dL


 


Creatinine   0.68   (0.52-1.04)  mg/dL


 


Estimated GFR   112.1   ML/MIN


 


Glucose   98   ()  mg/dL


 


Lactic Acid     (0.4-2.0)  


 


Calcium   9.4   (8.4-10.2)  mg/dL


 


Magnesium  1.9    (1.6-2.3)  mg/dL


 


Total Bilirubin   0.50   (0.2-1.3)  mg/dL


 


AST   34   (14-36)  U/L


 


ALT   26   (0-35)  U/L


 


Alkaline Phosphatase   62   ()  U/L


 


Troponin I  < 0.012    (0.000-0.033)  ng/mL


 


NT-Pro-B Natriuret Pep   30.6   (<300)  pg/mL


 


Serum Total Protein   7.2   (6.3-8.2)  g/dL


 


Albumin   4.0   (3.5-5.0)  g/dL


 


Amylase   53   ()  U/L


 


Lipase   155   ()  U/L


 


Serum HCG, Qual     (NEGATIVE)  


 


Monoscreen     (NEGATIVE)  


 


Influenza Type A Ag     (NEGATIVE)  


 


Influenza Type B Ag     (NEGATIVE)  


 


RSV (PCR)     (NEGATIVE)  


 


SARS-CoV-2 (PCR)     (NEGATIVE)  


 


Group A Strep Antibody     (NEGATIVE)  














- Progress


Progress: unchanged


Counseled pt/family regarding: lab results, diagnosis, need for follow-up, rad 

results





Medical Desision Making





- Diagnostic Testing


Diagnostic test were ordered, analyzed, and reviewed by me: Yes


Radiological Interpretation: Discussed w/ radiologist





- Departure


Departure Disposition: Home


Clinical Impression: 


 Infectious mononucleosis, RSV (respiratory syncytial virus infection), Dyspnea





Condition: Stable


Critical Care Time: No


Referrals: 


WARREN DUBON MD [Primary Care Provider] - Follow up/PCP as directed


Instructions:  Cough, Adult (DC), Mononucleosis, RSV in adults - Discharge 

instructions


Additional Instructions: 


Follow up with private doctor tomorrow.





Forms:  Work/School Release Form

## 2025-03-14 NOTE — XRAY
Indication: Cough.



Comparison: None



PA/lateral chest demonstrates normal heart, lungs, and bony thorax with a few

incidental calcified granulomas.